# Patient Record
Sex: FEMALE | Race: WHITE | NOT HISPANIC OR LATINO
[De-identification: names, ages, dates, MRNs, and addresses within clinical notes are randomized per-mention and may not be internally consistent; named-entity substitution may affect disease eponyms.]

---

## 2017-02-23 ENCOUNTER — APPOINTMENT (OUTPATIENT)
Dept: ORTHOPEDIC SURGERY | Facility: CLINIC | Age: 52
End: 2017-02-23

## 2018-04-02 ENCOUNTER — APPOINTMENT (OUTPATIENT)
Dept: ULTRASOUND IMAGING | Facility: CLINIC | Age: 53
End: 2018-04-02

## 2018-04-02 ENCOUNTER — APPOINTMENT (OUTPATIENT)
Dept: MAMMOGRAPHY | Facility: CLINIC | Age: 53
End: 2018-04-02

## 2018-04-05 ENCOUNTER — APPOINTMENT (OUTPATIENT)
Dept: OTOLARYNGOLOGY | Facility: CLINIC | Age: 53
End: 2018-04-05
Payer: MEDICARE

## 2018-04-05 VITALS
SYSTOLIC BLOOD PRESSURE: 142 MMHG | DIASTOLIC BLOOD PRESSURE: 78 MMHG | WEIGHT: 170 LBS | HEIGHT: 67 IN | HEART RATE: 81 BPM | BODY MASS INDEX: 26.68 KG/M2

## 2018-04-05 DIAGNOSIS — J37.0 CHRONIC LARYNGITIS: ICD-10-CM

## 2018-04-05 DIAGNOSIS — Z87.39 PERSONAL HISTORY OF OTHER DISEASES OF THE MUSCULOSKELETAL SYSTEM AND CONNECTIVE TISSUE: ICD-10-CM

## 2018-04-05 DIAGNOSIS — Z86.39 PERSONAL HISTORY OF OTHER ENDOCRINE, NUTRITIONAL AND METABOLIC DISEASE: ICD-10-CM

## 2018-04-05 DIAGNOSIS — R49.0 DYSPHONIA: ICD-10-CM

## 2018-04-05 DIAGNOSIS — Z87.09 PERSONAL HISTORY OF OTHER DISEASES OF THE RESPIRATORY SYSTEM: ICD-10-CM

## 2018-04-05 DIAGNOSIS — F17.200 NICOTINE DEPENDENCE, UNSPECIFIED, UNCOMPLICATED: ICD-10-CM

## 2018-04-05 DIAGNOSIS — K21.9 GASTRO-ESOPHAGEAL REFLUX DISEASE W/OUT ESOPHAGITIS: ICD-10-CM

## 2018-04-05 PROCEDURE — 99203 OFFICE O/P NEW LOW 30 MIN: CPT | Mod: 25

## 2018-04-05 PROCEDURE — 31575 DIAGNOSTIC LARYNGOSCOPY: CPT

## 2018-04-05 RX ORDER — OXYCODONE HYDROCHLORIDE 15 MG/1
15 TABLET ORAL
Refills: 0 | Status: ACTIVE | COMMUNITY

## 2018-04-05 RX ORDER — PANTOPRAZOLE 40 MG/1
40 TABLET, DELAYED RELEASE ORAL DAILY
Qty: 30 | Refills: 5 | Status: ACTIVE | COMMUNITY
Start: 2018-04-05 | End: 1900-01-01

## 2018-04-05 RX ORDER — ROSUVASTATIN CALCIUM 5 MG/1
TABLET, FILM COATED ORAL
Refills: 0 | Status: ACTIVE | COMMUNITY

## 2018-04-05 RX ORDER — PREDNISONE 10 MG/1
10 TABLET ORAL TWICE DAILY
Qty: 20 | Refills: 1 | Status: ACTIVE | COMMUNITY
Start: 2018-04-05 | End: 1900-01-01

## 2018-04-05 RX ORDER — ALPRAZOLAM 1 MG/1
1 TABLET ORAL
Refills: 0 | Status: ACTIVE | COMMUNITY

## 2018-04-05 RX ORDER — AZELASTINE HYDROCHLORIDE 137 UG/1
0.1 SPRAY, METERED NASAL TWICE DAILY
Qty: 1 | Refills: 5 | Status: ACTIVE | COMMUNITY
Start: 2018-04-05 | End: 1900-01-01

## 2018-04-18 ENCOUNTER — OUTPATIENT (OUTPATIENT)
Dept: OUTPATIENT SERVICES | Facility: HOSPITAL | Age: 53
LOS: 1 days | End: 2018-04-18
Payer: MEDICARE

## 2018-04-18 ENCOUNTER — APPOINTMENT (OUTPATIENT)
Dept: MAMMOGRAPHY | Facility: CLINIC | Age: 53
End: 2018-04-18
Payer: MEDICARE

## 2018-04-18 ENCOUNTER — APPOINTMENT (OUTPATIENT)
Dept: ULTRASOUND IMAGING | Facility: CLINIC | Age: 53
End: 2018-04-18
Payer: MEDICARE

## 2018-04-18 DIAGNOSIS — Z00.8 ENCOUNTER FOR OTHER GENERAL EXAMINATION: ICD-10-CM

## 2018-04-18 PROCEDURE — 77067 SCR MAMMO BI INCL CAD: CPT

## 2018-04-18 PROCEDURE — 77067 SCR MAMMO BI INCL CAD: CPT | Mod: 26

## 2018-04-18 PROCEDURE — 77063 BREAST TOMOSYNTHESIS BI: CPT | Mod: 26

## 2018-04-18 PROCEDURE — 77063 BREAST TOMOSYNTHESIS BI: CPT

## 2018-04-18 PROCEDURE — 76641 ULTRASOUND BREAST COMPLETE: CPT | Mod: 26,50

## 2018-04-18 PROCEDURE — 76641 ULTRASOUND BREAST COMPLETE: CPT

## 2018-04-23 ENCOUNTER — RX RENEWAL (OUTPATIENT)
Age: 53
End: 2018-04-23

## 2018-04-23 RX ORDER — AZELASTINE HYDROCHLORIDE 137 UG/1
0.1 SPRAY, METERED NASAL TWICE DAILY
Qty: 3 | Refills: 1 | Status: ACTIVE | COMMUNITY
Start: 2018-04-23 | End: 1900-01-01

## 2018-05-03 ENCOUNTER — APPOINTMENT (OUTPATIENT)
Dept: OTOLARYNGOLOGY | Facility: CLINIC | Age: 53
End: 2018-05-03

## 2018-06-26 ENCOUNTER — RX RENEWAL (OUTPATIENT)
Age: 53
End: 2018-06-26

## 2018-06-26 RX ORDER — PANTOPRAZOLE 40 MG/1
40 TABLET, DELAYED RELEASE ORAL DAILY
Qty: 30 | Refills: 3 | Status: ACTIVE | COMMUNITY
Start: 2018-06-26 | End: 1900-01-01

## 2018-07-29 ENCOUNTER — EMERGENCY (EMERGENCY)
Facility: HOSPITAL | Age: 53
LOS: 1 days | Discharge: HOME | End: 2018-07-29
Attending: EMERGENCY MEDICINE
Payer: MEDICARE

## 2018-07-29 VITALS
RESPIRATION RATE: 18 BRPM | DIASTOLIC BLOOD PRESSURE: 76 MMHG | TEMPERATURE: 98 F | SYSTOLIC BLOOD PRESSURE: 127 MMHG | OXYGEN SATURATION: 97 % | HEART RATE: 72 BPM

## 2018-07-29 VITALS — WEIGHT: 171.96 LBS | HEIGHT: 67 IN

## 2018-07-29 PROCEDURE — 72131 CT LUMBAR SPINE W/O DYE: CPT | Mod: 26

## 2018-07-29 PROCEDURE — 99284 EMERGENCY DEPT VISIT MOD MDM: CPT | Mod: 25

## 2018-07-29 PROCEDURE — 99284 EMERGENCY DEPT VISIT MOD MDM: CPT

## 2018-07-29 PROCEDURE — 72131 CT LUMBAR SPINE W/O DYE: CPT

## 2018-07-29 RX ORDER — IBUPROFEN 200 MG
600 TABLET ORAL ONCE
Qty: 0 | Refills: 0 | Status: COMPLETED | OUTPATIENT
Start: 2018-07-29 | End: 2018-07-29

## 2018-07-29 RX ADMIN — Medication 600 MILLIGRAM(S): at 21:21

## 2018-07-29 RX ADMIN — Medication 600 MILLIGRAM(S): at 21:51

## 2018-07-29 NOTE — ED STATDOCS - OBJECTIVE STATEMENT
52 yo F Pt, PmHx: lumbar herniated discs, chronic back pain, HLD, presents to ED complaining of lumbar back pain x 4 days. PT states she fell 4 days ago, and admits to associated back pain. PT states she was seen for fall 4 days ago at , xrays of shoulder, lumbar spine , chest , and knees. PT advised to come in to ED for further workup. PT denies fever ,chills, head injury, LOC, numbness, tingling, incontinence, abdominal pain, urinary symptoms, difficulty ambulating, and any other acute symptoms at this time.

## 2018-07-29 NOTE — ED STATDOCS - ATTENDING CONTRIBUTION TO CARE
53 year old with hx of herniated disc c/o increased back pain s/p fall.  Xrays reviewed showing no acute fracture.  CT ordered by PA-verifying that there is indeed no fracture.  Patient given medication for pain and instructed to follow-up with spine. 53 year old with hx of herniated disc c/o increased back pain s/p fall.  Xrays reviewed showing no acute fracture.  Patient reproducible lumbar tenderness on exam.  CT ordered by PA-verifying that there is indeed no fracture.  Patient given medication for pain and instructed to follow-up with spine.

## 2018-07-29 NOTE — ED STATDOCS - MUSCULOSKELETAL, MLM
range of motion is not limited and there is no muscle tenderness. + TTP of R lumbar midline and paraspinal muscle. FROM of B/L LE

## 2018-07-29 NOTE — ED STATDOCS - MEDICAL DECISION MAKING DETAILS
Back pain, will obtain CT lumbar spine r/o acute fx, PT currently on pain medications from pain management, PT states pain is controlled, will refer to Spine MD

## 2018-08-28 ENCOUNTER — APPOINTMENT (OUTPATIENT)
Dept: MRI IMAGING | Facility: CLINIC | Age: 53
End: 2018-08-28

## 2019-03-06 ENCOUNTER — APPOINTMENT (OUTPATIENT)
Dept: INTERNAL MEDICINE | Facility: CLINIC | Age: 54
End: 2019-03-06
Payer: MEDICARE

## 2019-03-06 VITALS
WEIGHT: 167 LBS | HEIGHT: 67 IN | BODY MASS INDEX: 26.21 KG/M2 | DIASTOLIC BLOOD PRESSURE: 80 MMHG | SYSTOLIC BLOOD PRESSURE: 150 MMHG

## 2019-03-06 PROCEDURE — 99204 OFFICE O/P NEW MOD 45 MIN: CPT | Mod: 25

## 2019-03-06 PROCEDURE — 36415 COLL VENOUS BLD VENIPUNCTURE: CPT

## 2019-03-06 NOTE — HISTORY OF PRESENT ILLNESS
[FreeTextEntry1] : 52y/o woman with chronic low back pain due to disc herniation, s/p epidural in the past, and anxiety follows with psychotherapy weekly, is here complaining of seeing bugs at home that bite her and since few months back she is losing her hair and eyebrows. She is very anxious and frustrated. They had  3 times for whole house, staying in a hotel but it doesn't work. She feels that bugs are also bothering his boyfriend but he doesn't complain. \par he has appt to see psychiatrist next week. \par During interview and exam she is very emotional and cries.

## 2019-03-06 NOTE — PHYSICAL EXAM
[General Appearance - Alert] : alert [General Appearance - In No Acute Distress] : in no acute distress [Sclera] : the sclera and conjunctiva were normal [PERRL With Normal Accommodation] : pupils were equal in size, round, reactive to light [Extraocular Movements] : extraocular movements were intact [Outer Ear] : the ears and nose were normal in appearance [Oropharynx] : the oropharynx was normal with no thrush [Neck Appearance] : the appearance of the neck was normal [Neck Cervical Mass (___cm)] : no neck mass was observed [Jugular Venous Distention Increased] : there was no jugular-venous distention [Thyroid Diffuse Enlargement] : the thyroid was not enlarged [Auscultation Breath Sounds / Voice Sounds] : lungs were clear to auscultation bilaterally [Heart Rate And Rhythm] : heart rate was normal and rhythm regular [Heart Sounds] : normal S1 and S2 [Heart Sounds Gallop] : no gallops [Murmurs] : no murmurs [Heart Sounds Pericardial Friction Rub] : no pericardial rub [Full Pulse] : the pedal pulses are present [Edema] : there was no peripheral edema [Bowel Sounds] : normal bowel sounds [Abdomen Soft] : soft [Abdomen Tenderness] : non-tender [Abdomen Mass (___ Cm)] : no abdominal mass palpated [Costovertebral Angle Tenderness] : no CVA tenderness [No Palpable Adenopathy] : no palpable adenopathy [Musculoskeletal - Swelling] : no joint swelling [Nail Clubbing] : no clubbing  or cyanosis of the fingernails [Motor Tone] : muscle strength and tone were normal [Skin Color & Pigmentation] : normal skin color and pigmentation [] : no rash [Deep Tendon Reflexes (DTR)] : deep tendon reflexes were 2+ and symmetric [Sensation] : the sensory exam was normal to light touch and pinprick [No Focal Deficits] : no focal deficits [FreeTextEntry1] : few small folliculitis type or bite marks

## 2019-03-08 LAB
ALBUMIN SERPL ELPH-MCNC: 4.6 G/DL
ALP BLD-CCNC: 73 U/L
ALT SERPL-CCNC: 16 U/L
ANION GAP SERPL CALC-SCNC: 11 MMOL/L
APPEARANCE: CLEAR
AST SERPL-CCNC: 16 U/L
BACTERIA: NEGATIVE
BASOPHILS # BLD AUTO: 0.03 K/UL
BASOPHILS NFR BLD AUTO: 0.3 %
BILIRUB DIRECT SERPL-MCNC: 0.1 MG/DL
BILIRUB INDIRECT SERPL-MCNC: 0.2 MG/DL
BILIRUB SERPL-MCNC: 0.3 MG/DL
BILIRUBIN URINE: NEGATIVE
BLOOD URINE: NEGATIVE
BUN SERPL-MCNC: 12 MG/DL
CALCIUM SERPL-MCNC: 10.2 MG/DL
CHLORIDE SERPL-SCNC: 103 MMOL/L
CHOLEST SERPL-MCNC: 234 MG/DL
CHOLEST/HDLC SERPL: 3.7 RATIO
CO2 SERPL-SCNC: 28 MMOL/L
COLOR: YELLOW
CREAT SERPL-MCNC: 0.69 MG/DL
EOSINOPHIL # BLD AUTO: 0.14 K/UL
EOSINOPHIL NFR BLD AUTO: 1.6 %
GLUCOSE QUALITATIVE U: NEGATIVE
GLUCOSE SERPL-MCNC: 93 MG/DL
HBA1C MFR BLD HPLC: 5.4 %
HCT VFR BLD CALC: 44.8 %
HDLC SERPL-MCNC: 64 MG/DL
HGB BLD-MCNC: 14.7 G/DL
HYALINE CASTS: 1 /LPF
IMM GRANULOCYTES NFR BLD AUTO: 0.2 %
KETONES URINE: NEGATIVE
LDLC SERPL CALC-MCNC: 144 MG/DL
LEUKOCYTE ESTERASE URINE: NEGATIVE
LYMPHOCYTES # BLD AUTO: 3.06 K/UL
LYMPHOCYTES NFR BLD AUTO: 34.8 %
MAN DIFF?: NORMAL
MCHC RBC-ENTMCNC: 29.8 PG
MCHC RBC-ENTMCNC: 32.8 GM/DL
MCV RBC AUTO: 90.9 FL
MICROSCOPIC-UA: NORMAL
MONOCYTES # BLD AUTO: 0.41 K/UL
MONOCYTES NFR BLD AUTO: 4.7 %
NEUTROPHILS # BLD AUTO: 5.13 K/UL
NEUTROPHILS NFR BLD AUTO: 58.4 %
NITRITE URINE: NEGATIVE
PH URINE: 5.5
PLATELET # BLD AUTO: 264 K/UL
POTASSIUM SERPL-SCNC: 4.6 MMOL/L
PROT SERPL-MCNC: 7.5 G/DL
PROTEIN URINE: NEGATIVE
RBC # BLD: 4.93 M/UL
RBC # FLD: 12.9 %
RED BLOOD CELLS URINE: 4 /HPF
SODIUM SERPL-SCNC: 142 MMOL/L
SPECIFIC GRAVITY URINE: 1.02
SQUAMOUS EPITHELIAL CELLS: 1 /HPF
TRIGL SERPL-MCNC: 129 MG/DL
TSH SERPL-ACNC: 0.84 UIU/ML
UROBILINOGEN URINE: NORMAL
WBC # FLD AUTO: 8.79 K/UL
WHITE BLOOD CELLS URINE: 0 /HPF

## 2019-12-10 ENCOUNTER — OTHER (OUTPATIENT)
Age: 54
End: 2019-12-10

## 2019-12-13 ENCOUNTER — APPOINTMENT (OUTPATIENT)
Dept: ORTHOPEDIC SURGERY | Facility: CLINIC | Age: 54
End: 2019-12-13

## 2019-12-19 ENCOUNTER — APPOINTMENT (OUTPATIENT)
Dept: ORTHOPEDIC SURGERY | Facility: CLINIC | Age: 54
End: 2019-12-19

## 2020-03-31 ENCOUNTER — APPOINTMENT (OUTPATIENT)
Dept: ULTRASOUND IMAGING | Facility: CLINIC | Age: 55
End: 2020-03-31
Payer: MEDICARE

## 2020-03-31 ENCOUNTER — APPOINTMENT (OUTPATIENT)
Dept: MAMMOGRAPHY | Facility: CLINIC | Age: 55
End: 2020-03-31
Payer: MEDICARE

## 2020-03-31 ENCOUNTER — OUTPATIENT (OUTPATIENT)
Dept: OUTPATIENT SERVICES | Facility: HOSPITAL | Age: 55
LOS: 1 days | End: 2020-03-31
Payer: MEDICARE

## 2020-03-31 DIAGNOSIS — R92.8 OTHER ABNORMAL AND INCONCLUSIVE FINDINGS ON DIAGNOSTIC IMAGING OF BREAST: ICD-10-CM

## 2020-03-31 PROCEDURE — 76641 ULTRASOUND BREAST COMPLETE: CPT | Mod: 26,50

## 2020-03-31 PROCEDURE — G0279: CPT | Mod: 26

## 2020-03-31 PROCEDURE — 77066 DX MAMMO INCL CAD BI: CPT | Mod: 26

## 2020-03-31 PROCEDURE — 77066 DX MAMMO INCL CAD BI: CPT

## 2020-03-31 PROCEDURE — G0279: CPT

## 2020-03-31 PROCEDURE — 76641 ULTRASOUND BREAST COMPLETE: CPT

## 2020-04-14 DIAGNOSIS — Z91.89 OTHER SPECIFIED PERSONAL RISK FACTORS, NOT ELSEWHERE CLASSIFIED: ICD-10-CM

## 2020-04-20 ENCOUNTER — APPOINTMENT (OUTPATIENT)
Dept: MRI IMAGING | Facility: CLINIC | Age: 55
End: 2020-04-20
Payer: MEDICARE

## 2020-04-20 ENCOUNTER — RESULT REVIEW (OUTPATIENT)
Age: 55
End: 2020-04-20

## 2020-04-20 ENCOUNTER — OUTPATIENT (OUTPATIENT)
Dept: OUTPATIENT SERVICES | Facility: HOSPITAL | Age: 55
LOS: 1 days | End: 2020-04-20
Payer: MEDICARE

## 2020-04-20 DIAGNOSIS — Z91.89 OTHER SPECIFIED PERSONAL RISK FACTORS, NOT ELSEWHERE CLASSIFIED: ICD-10-CM

## 2020-04-20 PROCEDURE — C8937: CPT

## 2020-04-20 PROCEDURE — C8908: CPT

## 2020-04-20 PROCEDURE — A9585: CPT

## 2020-04-20 PROCEDURE — 77049 MRI BREAST C-+ W/CAD BI: CPT | Mod: 26

## 2020-04-28 ENCOUNTER — APPOINTMENT (OUTPATIENT)
Dept: SURGERY | Facility: CLINIC | Age: 55
End: 2020-04-28

## 2020-09-05 ENCOUNTER — TRANSCRIPTION ENCOUNTER (OUTPATIENT)
Age: 55
End: 2020-09-05

## 2021-08-24 ENCOUNTER — TRANSCRIPTION ENCOUNTER (OUTPATIENT)
Age: 56
End: 2021-08-24

## 2022-05-21 ENCOUNTER — NON-APPOINTMENT (OUTPATIENT)
Age: 57
End: 2022-05-21

## 2022-08-13 ENCOUNTER — EMERGENCY (EMERGENCY)
Facility: HOSPITAL | Age: 57
LOS: 1 days | Discharge: OTHER INSTITUTION | End: 2022-08-13
Attending: STUDENT IN AN ORGANIZED HEALTH CARE EDUCATION/TRAINING PROGRAM
Payer: MEDICARE

## 2022-08-13 VITALS
WEIGHT: 160.06 LBS | OXYGEN SATURATION: 96 % | HEART RATE: 76 BPM | HEIGHT: 67 IN | TEMPERATURE: 99 F | DIASTOLIC BLOOD PRESSURE: 101 MMHG | SYSTOLIC BLOOD PRESSURE: 146 MMHG | RESPIRATION RATE: 16 BRPM

## 2022-08-13 DIAGNOSIS — F43.29 ADJUSTMENT DISORDER WITH OTHER SYMPTOMS: ICD-10-CM

## 2022-08-13 LAB
AMPHET UR-MCNC: NEGATIVE — SIGNIFICANT CHANGE UP
ANION GAP SERPL CALC-SCNC: 10 MMOL/L — SIGNIFICANT CHANGE UP (ref 5–17)
APAP SERPL-MCNC: <3 UG/ML — LOW (ref 10–26)
APPEARANCE UR: CLEAR — SIGNIFICANT CHANGE UP
BACTERIA # UR AUTO: ABNORMAL
BARBITURATES UR SCN-MCNC: NEGATIVE — SIGNIFICANT CHANGE UP
BASOPHILS # BLD AUTO: 0.07 K/UL — SIGNIFICANT CHANGE UP (ref 0–0.2)
BASOPHILS NFR BLD AUTO: 0.6 % — SIGNIFICANT CHANGE UP (ref 0–2)
BENZODIAZ UR-MCNC: POSITIVE
BILIRUB UR-MCNC: NEGATIVE — SIGNIFICANT CHANGE UP
BUN SERPL-MCNC: 19.2 MG/DL — SIGNIFICANT CHANGE UP (ref 8–20)
CALCIUM SERPL-MCNC: 9.3 MG/DL — SIGNIFICANT CHANGE UP (ref 8.4–10.5)
CHLORIDE SERPL-SCNC: 101 MMOL/L — SIGNIFICANT CHANGE UP (ref 98–107)
CO2 SERPL-SCNC: 27 MMOL/L — SIGNIFICANT CHANGE UP (ref 22–29)
COCAINE METAB.OTHER UR-MCNC: NEGATIVE — SIGNIFICANT CHANGE UP
COLOR SPEC: YELLOW — SIGNIFICANT CHANGE UP
CREAT SERPL-MCNC: 0.75 MG/DL — SIGNIFICANT CHANGE UP (ref 0.5–1.3)
DIFF PNL FLD: ABNORMAL
EGFR: 92 ML/MIN/1.73M2 — SIGNIFICANT CHANGE UP
EOSINOPHIL # BLD AUTO: 0.46 K/UL — SIGNIFICANT CHANGE UP (ref 0–0.5)
EOSINOPHIL NFR BLD AUTO: 3.7 % — SIGNIFICANT CHANGE UP (ref 0–6)
EPI CELLS # UR: SIGNIFICANT CHANGE UP
ETHANOL SERPL-MCNC: <10 MG/DL — SIGNIFICANT CHANGE UP (ref 0–9)
GLUCOSE SERPL-MCNC: 100 MG/DL — HIGH (ref 70–99)
GLUCOSE UR QL: NEGATIVE MG/DL — SIGNIFICANT CHANGE UP
HCG UR QL: NEGATIVE — SIGNIFICANT CHANGE UP
HCT VFR BLD CALC: 43.7 % — SIGNIFICANT CHANGE UP (ref 34.5–45)
HGB BLD-MCNC: 15.3 G/DL — SIGNIFICANT CHANGE UP (ref 11.5–15.5)
IMM GRANULOCYTES NFR BLD AUTO: 0.3 % — SIGNIFICANT CHANGE UP (ref 0–1.5)
KETONES UR-MCNC: NEGATIVE — SIGNIFICANT CHANGE UP
LEUKOCYTE ESTERASE UR-ACNC: ABNORMAL
LYMPHOCYTES # BLD AUTO: 3.72 K/UL — HIGH (ref 1–3.3)
LYMPHOCYTES # BLD AUTO: 30 % — SIGNIFICANT CHANGE UP (ref 13–44)
MCHC RBC-ENTMCNC: 32.1 PG — SIGNIFICANT CHANGE UP (ref 27–34)
MCHC RBC-ENTMCNC: 35 GM/DL — SIGNIFICANT CHANGE UP (ref 32–36)
MCV RBC AUTO: 91.6 FL — SIGNIFICANT CHANGE UP (ref 80–100)
METHADONE UR-MCNC: NEGATIVE — SIGNIFICANT CHANGE UP
MONOCYTES # BLD AUTO: 0.7 K/UL — SIGNIFICANT CHANGE UP (ref 0–0.9)
MONOCYTES NFR BLD AUTO: 5.7 % — SIGNIFICANT CHANGE UP (ref 2–14)
NEUTROPHILS # BLD AUTO: 7.39 K/UL — SIGNIFICANT CHANGE UP (ref 1.8–7.4)
NEUTROPHILS NFR BLD AUTO: 59.7 % — SIGNIFICANT CHANGE UP (ref 43–77)
NITRITE UR-MCNC: NEGATIVE — SIGNIFICANT CHANGE UP
OPIATES UR-MCNC: NEGATIVE — SIGNIFICANT CHANGE UP
PCP SPEC-MCNC: SIGNIFICANT CHANGE UP
PCP UR-MCNC: NEGATIVE — SIGNIFICANT CHANGE UP
PH UR: 7 — SIGNIFICANT CHANGE UP (ref 5–8)
PLATELET # BLD AUTO: 234 K/UL — SIGNIFICANT CHANGE UP (ref 150–400)
POTASSIUM SERPL-MCNC: 4.6 MMOL/L — SIGNIFICANT CHANGE UP (ref 3.5–5.3)
POTASSIUM SERPL-SCNC: 4.6 MMOL/L — SIGNIFICANT CHANGE UP (ref 3.5–5.3)
PROT UR-MCNC: 15 MG/DL
RBC # BLD: 4.77 M/UL — SIGNIFICANT CHANGE UP (ref 3.8–5.2)
RBC # FLD: 13.2 % — SIGNIFICANT CHANGE UP (ref 10.3–14.5)
RBC CASTS # UR COMP ASSIST: SIGNIFICANT CHANGE UP /HPF (ref 0–4)
SALICYLATES SERPL-MCNC: <0.6 MG/DL — LOW (ref 10–20)
SARS-COV-2 RNA SPEC QL NAA+PROBE: SIGNIFICANT CHANGE UP
SODIUM SERPL-SCNC: 138 MMOL/L — SIGNIFICANT CHANGE UP (ref 135–145)
SP GR SPEC: 1.01 — SIGNIFICANT CHANGE UP (ref 1.01–1.02)
THC UR QL: NEGATIVE — SIGNIFICANT CHANGE UP
TSH SERPL-MCNC: 1.51 UIU/ML — SIGNIFICANT CHANGE UP (ref 0.27–4.2)
UROBILINOGEN FLD QL: NEGATIVE MG/DL — SIGNIFICANT CHANGE UP
WBC # BLD: 12.38 K/UL — HIGH (ref 3.8–10.5)
WBC # FLD AUTO: 12.38 K/UL — HIGH (ref 3.8–10.5)
WBC UR QL: ABNORMAL /HPF (ref 0–5)

## 2022-08-13 PROCEDURE — 90792 PSYCH DIAG EVAL W/MED SRVCS: CPT

## 2022-08-13 PROCEDURE — 99218: CPT

## 2022-08-13 PROCEDURE — 93010 ELECTROCARDIOGRAM REPORT: CPT

## 2022-08-13 RX ORDER — NICOTINE POLACRILEX 2 MG
1 GUM BUCCAL DAILY
Refills: 0 | Status: DISCONTINUED | OUTPATIENT
Start: 2022-08-13 | End: 2022-08-17

## 2022-08-13 RX ORDER — OXYCODONE HYDROCHLORIDE 5 MG/1
30 TABLET ORAL ONCE
Refills: 0 | Status: DISCONTINUED | OUTPATIENT
Start: 2022-08-13 | End: 2022-08-13

## 2022-08-13 RX ORDER — ALPRAZOLAM 0.25 MG
2 TABLET ORAL ONCE
Refills: 0 | Status: DISCONTINUED | OUTPATIENT
Start: 2022-08-13 | End: 2022-08-13

## 2022-08-13 RX ORDER — OXYCODONE HYDROCHLORIDE 5 MG/1
30 TABLET ORAL EVERY 6 HOURS
Refills: 0 | Status: DISCONTINUED | OUTPATIENT
Start: 2022-08-13 | End: 2022-08-15

## 2022-08-13 RX ORDER — ACETAMINOPHEN 500 MG
975 TABLET ORAL ONCE
Refills: 0 | Status: COMPLETED | OUTPATIENT
Start: 2022-08-13 | End: 2022-08-13

## 2022-08-13 RX ORDER — ALPRAZOLAM 0.25 MG
1 TABLET ORAL THREE TIMES A DAY
Refills: 0 | Status: COMPLETED | OUTPATIENT
Start: 2022-08-13 | End: 2022-08-20

## 2022-08-13 RX ORDER — DEXTROAMPHETAMINE SACCHARATE, AMPHETAMINE ASPARTATE, DEXTROAMPHETAMINE SULFATE AND AMPHETAMINE SULFATE 1.875; 1.875; 1.875; 1.875 MG/1; MG/1; MG/1; MG/1
0 TABLET ORAL
Qty: 0 | Refills: 0 | DISCHARGE

## 2022-08-13 RX ORDER — ATENOLOL 25 MG/1
10 TABLET ORAL
Qty: 0 | Refills: 0 | DISCHARGE

## 2022-08-13 RX ADMIN — Medication 1 MILLIGRAM(S): at 14:58

## 2022-08-13 RX ADMIN — Medication 975 MILLIGRAM(S): at 09:44

## 2022-08-13 RX ADMIN — Medication 975 MILLIGRAM(S): at 10:43

## 2022-08-13 RX ADMIN — Medication 1 PATCH: at 07:17

## 2022-08-13 RX ADMIN — OXYCODONE HYDROCHLORIDE 30 MILLIGRAM(S): 5 TABLET ORAL at 12:30

## 2022-08-13 RX ADMIN — Medication 2 MILLIGRAM(S): at 03:42

## 2022-08-13 RX ADMIN — OXYCODONE HYDROCHLORIDE 30 MILLIGRAM(S): 5 TABLET ORAL at 20:35

## 2022-08-13 RX ADMIN — OXYCODONE HYDROCHLORIDE 30 MILLIGRAM(S): 5 TABLET ORAL at 10:57

## 2022-08-13 RX ADMIN — Medication 1 PATCH: at 22:06

## 2022-08-13 RX ADMIN — Medication 1 MILLIGRAM(S): at 22:11

## 2022-08-13 RX ADMIN — Medication 1 PATCH: at 06:23

## 2022-08-13 RX ADMIN — OXYCODONE HYDROCHLORIDE 30 MILLIGRAM(S): 5 TABLET ORAL at 21:31
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
## 2022-08-13 NOTE — ED ADULT NURSE REASSESSMENT NOTE - NS ED NURSE REASSESS COMMENT FT1
Patient in bed sleeping, easily arousable, no complain voiced, no suicidal thoughts, no visual or auditory hallucination reported, meals and PO fluids tolerated, safety maintained.

## 2022-08-13 NOTE — ED ADULT NURSE REASSESSMENT NOTE - NS ED NURSE REASSESS COMMENT FT1
Assumed care of patient at 0715.  Patient oriented to staff and educated about plan of care including urine sample for testing, EKG, and pending consult.  Patient verbalized understanding of plan of care.  No overt delusions expressed during interaction.  No attempts to harm self or others and safety maintained.

## 2022-08-13 NOTE — ED ADULT NURSE REASSESSMENT NOTE - NS ED NURSE REASSESS COMMENT FT1
Patient complained of back pain, 30 mg on Oxycodone given as per order, nursing will monitor effectiveness, safety maintained.
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
## 2022-08-13 NOTE — ED BEHAVIORAL HEALTH ASSESSMENT NOTE - DESCRIPTION
unemployed DIYA cooperativeVital Signs Last 24 Hrs  T(C): 36.8 (13 Aug 2022 11:03), Max: 37.1 (13 Aug 2022 01:04)  T(F): 98.3 (13 Aug 2022 11:03), Max: 98.8 (13 Aug 2022 01:04)  HR: 72 (13 Aug 2022 11:03) (72 - 99)  BP: 164/78 (13 Aug 2022 11:03) (97/54 - 164/78)  BP(mean): --  RR: 18 (13 Aug 2022 11:03) (16 - 19)  SpO2: 97% (13 Aug 2022 11:03) (96% - 98%)    Parameters below as of 13 Aug 2022 11:03  Patient On (Oxygen Delivery Method): room air
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
## 2022-08-13 NOTE — ED PROVIDER NOTE - PHYSICAL EXAMINATION
Gen: Well appearing in NAD  Head: NC/AT  Neck: trachea midline  Resp:  No distress, lungs cta b/l  Cardiac: Heart rrr. No murmur.   Abdomen: Soft. nontender. Nondistended.   Ext: no deformities  Neuro:  A&O appears non focal  Skin:  Warm and dry as visualized  Psych:  Denies HI/SI. Denies hallucinations. Endorses paranoia.

## 2022-08-13 NOTE — ED ADULT NURSE REASSESSMENT NOTE - NS ED NURSE REASSESS COMMENT FT1
Patient intermittently resting in bed.  Patient endorsed feeling safe in the hospital.  Patient ate 100% of her dinner.  No attempts top harm self or others and safety maintained.
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
## 2022-08-13 NOTE — ED PROVIDER NOTE - ATTENDING CONTRIBUTION TO CARE
Anxious and paranoid appears acutely psychotic, collateral expressing concerns about escalating behavior recently. Evaluated by psychiatry, will likely need admission but will be held for reassessment for final disposition.

## 2022-08-13 NOTE — ED BEHAVIORAL HEALTH ASSESSMENT NOTE - CURRENT PASSIVE IDEATION:
Patient to ED for Altered mental status due to polysubstance abuse.  Known to EMS for heroine use.  Arousable to luis None known

## 2022-08-13 NOTE — ED BEHAVIORAL HEALTH ASSESSMENT NOTE - HYGIENE
HPI


Chief Complaint:  Diabetic


Time Seen by Provider:  22:43


Travel History


International Travel<30 days:  No


Contact w/Intl Traveler<30days:  No


Traveled to known affect area:  No





History of Present Illness


HPI


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 

admitted for pneumonia.  And so over the last few days the patient has 

developed a cough along with asthma exacerbation, her primary care physician 

started her on amoxicillin and a Medrol Dosepak as well as given her a Decadron 

shot today.  The patient has started to notice that her glucose levels were up 

in the 300s and then 400s and finally the 500s despite her using her 

medications.  Patient was also started on TRESIBA for better control, she 

received her first dose today at 5:00.














Patient has an allergy to sulfa, morphine, and shellfish


Past medical history significant for hypercholesterolemia, hypertension, asthma

, ulcer, pancreatitis, cholecystectomy, appendectomy, hysterectomy, diabetes





PFSH


Past Medical History


Arthritis:  No


Asthma:  Yes


Anxiety:  Yes


Depression:  Yes


Heart Rhythm Problems:  No


Cancer:  No


Cardiac Catheterization:  No


Cardiovascular Problems:  Yes


High Cholesterol:  Yes (at one time, pt takes fish oil)


Chest Pain:  No


Congestive Heart Failure:  No


COPD:  No


Diabetes:  Yes


Patient Takes Glucophage:  Yes


Diminished Hearing:  No


Endocrine:  Yes


Gastrointestinal Disorders:  Yes (PAIN TO RIGHT UPPER QUAD, WORSE THIS TIME)


GERD:  Yes


Genitourinary:  No


Hiatal Hernia:  No


Heparin Induced Thrombocytopen:  No


Hypertension:  Yes


Immune Disorder:  No


Implanted Vascular Access Dvce:  No


Kidney Stones:  No


Musculoskeletal:  Yes


Neurologic:  No


Psychiatric:  Yes


Reproductive:  Yes (HYSTERECTOMY)


Respiratory:  Yes


Immunizations Current:  Yes


Migraines:  No


Pancreatitis:  Yes


Sleep Apnea:  No


Thyroid Disease:  No


Ulcer:  Yes


Tetanus Vaccination:  < 5 Years


Influenza Vaccination:  Yes


Pregnant?:  Not Pregnant


:  1


Para:  1





Past Surgical History


Abdominal Surgery:  Yes (ENDOMETRIAL TISSUE REMOVED )


Appendectomy:  Yes


Cardiac Surgery:  No


 Section:  Yes ()


Cholecystectomy:  Yes


Coronary Artery Bypass Graft:  No


Ear Surgery:  No


Endocrine Surgery:  No


Eye Surgery:  No


Genitourinary Surgery:  No


Gynecologic Surgery:  Yes (EXPLORATORY LAP , EXPL LAP  C CAUTERIZATION  

)


Hysterectomy:  Yes


Insulin Pump:  No


Joint Replacement:  No


Neurologic Surgery:  No


Oral Surgery:  No


Pacemaker:  No


Thoracic Surgery:  No


Other Surgery:  Yes (EXP LAP, EXP ABD SURGERY)





Social History


Alcohol Use:  No


Tobacco Use:  No


Substance Use:  No





Allergies-Medications


(Allergen,Severity, Reaction):  


Coded Allergies:  


     Sulfa (Sulfonamide Antibiotics) (Unverified  Allergy, Intermediate, HIVES, 

18)


     morphine (Unverified  Allergy, Intermediate, HIVES, 18)


     shellfish derived (Verified  Allergy, Unknown, Anaphylaxis, 18)


Reported Meds & Prescriptions





Reported Meds & Active Scripts


Active


Doxycycline Hyclate 100 Mg Cap 100 Mg PO BID


Hydrocodone-Acetaminophen  mg Tab 1 Tab PO Q4H PRN


Cipro (Ciprofloxacin HCl) 250 Mg Tab 250 Mg PO BID 5 Days


Metformin (Metformin HCl) 1,000 Mg Tab 1,000 Mg PO BIDPC


     With meals


Reported


Trazodone (Trazodone HCl) 150 Mg Tablet 150 Mg PO HS


Celexa (Citalopram Hydrobromide) 40 Mg Tab 60 Mg PO DAILY


Hydrocodone-Acetaminophen 7.5-325 mg Tab 1 Tab PO DAILY PRN


Proventil Hfa 6.7 GM Inh (Albuterol Sulfate) 90 Mcg/Act Aer 1 Puff INH Q4H PRN


Metoprolol Tartrate 25 Mg Tab 25 Mg PO BID








Review of Systems


General / Constitutional:  No: Fever


Eyes:  No: Visual changes


HENT:  No: Headaches


Cardiovascular:  No: Chest Pain or Discomfort


Respiratory:  No: Shortness of Breath


Gastrointestinal:  No: Abdominal Pain


Genitourinary:  No: Dysuria


Musculoskeletal:  No: Pain


Skin:  No Rash


Neurologic:  No: Weakness


Psychiatric:  No: Depression


Endocrine:  Positive: Polyuria, Polydipsia, Other (Hyperglycemia)


Hematologic/Lymphatic:  No: Easy Bruising





Physical Exam


Narrative


GENERAL: 


SKIN: Warm and dry.


HEAD: Atraumatic. Normocephalic. 


EYES: Pupils equal and round. No scleral icterus. No injection or drainage. 


ENT: No nasal bleeding or discharge.  Mucous membranes pink and moist.


NECK: Trachea midline. No JVD. 


CARDIOVASCULAR: Regular rate and rhythm.  


RESPIRATORY: No accessory muscle use. Clear to auscultation. Breath sounds 

equal bilaterally. 


GASTROINTESTINAL: Abdomen soft, non-tender, nondistended. 


MUSCULOSKELETAL: Extremities without clubbing, cyanosis, or edema. No obvious 

deformities. 


NEUROLOGICAL: Awake and alert. No obvious cranial nerve deficits.  Motor 

grossly within normal limits. Five out of 5 muscle strength in the arms and 

legs.  Normal speech.


PSYCHIATRIC: Appropriate mood and affect; insight and judgment normal.





Data


Data


Last Documented VS





Vital Signs








  Date Time  Temp Pulse Resp B/P (MAP) Pulse Ox O2 Delivery O2 Flow Rate FiO2


 


18 23:10   18  97 Room Air  


 


18 22:36 98.5 105  149/72 (97)    








Orders





 Orders


Electrocardiogram (18 22:53)


Complete Blood Count With Diff (18 22:53)


Comprehensive Metabolic Panel (18 22:53)


Troponin I (18 22:53)


Lipase (18 22:53)


Blood Gas Venous Ph (18 22:53)


Chest, Single Ap (18 22:53)


Insulin Human Regular Inj (Novolin R Inj (18 23:00)


Sodium Chlor 0.9% 1000 Ml Inj (Ns 1000 M (18 23:30)


Insulin Human Regular Inj (Novolin R Inj (18 00:15)


Ondansetron  Odt (Zofran  Odt) (18 00:15)


Albuterol-Ipratropium Neb (Duoneb Neb) (18 00:30)


Insulin Human Regular Inj (Novolin R Inj (18 00:30)


Ed Discharge Order (18 00:42)





Labs





Laboratory Tests








Test


  18


23:04 18


23:17


 


White Blood Count 10.1 TH/MM3  


 


Red Blood Count 5.49 MIL/MM3  


 


Hemoglobin 15.7 GM/DL  


 


Hematocrit 48.4 %  


 


Mean Corpuscular Volume 88.2 FL  


 


Mean Corpuscular Hemoglobin 28.6 PG  


 


Mean Corpuscular Hemoglobin


Concent 32.4 % 


  


 


 


Red Cell Distribution Width 11.6 %  


 


Platelet Count 273 TH/MM3  


 


Mean Platelet Volume 9.2 FL  


 


Neutrophils (%) (Auto) 86.4 %  


 


Lymphocytes (%) (Auto) 11.1 %  


 


Monocytes (%) (Auto) 0.7 %  


 


Eosinophils (%) (Auto) 0.1 %  


 


Basophils (%) (Auto) 1.7 %  


 


Neutrophils # (Auto) 8.7 TH/MM3  


 


Lymphocytes # (Auto) 1.1 TH/MM3  


 


Monocytes # (Auto) 0.1 TH/MM3  


 


Eosinophils # (Auto) 0.0 TH/MM3  


 


Basophils # (Auto) 0.2 TH/MM3  


 


CBC Comment DIFF FINAL  


 


Differential Comment   


 


Blood Urea Nitrogen 17 MG/DL  


 


Creatinine 1.20 MG/DL  


 


Random Glucose 622 MG/DL  


 


Total Protein 8.3 GM/DL  


 


Albumin 4.3 GM/DL  


 


Calcium Level 9.4 MG/DL  


 


Alkaline Phosphatase 160 U/L  


 


Aspartate Amino Transf


(AST/SGOT) 29 U/L 


  


 


 


Alanine Aminotransferase


(ALT/SGPT) 45 U/L 


  


 


 


Total Bilirubin 0.4 MG/DL  


 


Sodium Level 128 MEQ/L  


 


Potassium Level 4.4 MEQ/L  


 


Chloride Level 93 MEQ/L  


 


Carbon Dioxide Level 22.7 MEQ/L  


 


Anion Gap 12 MEQ/L  


 


Estimat Glomerular Filtration


Rate 49 ML/MIN 


  


 


 


Troponin I


  LESS THAN 0.02


NG/ML 


 


 


Lipase 533 U/L  


 


Venous Blood pH  7.38 











MDM


Medical Decision Making


Medical Screen Exam Complete:  Yes


Emergency Medical Condition:  Yes


Medical Record Reviewed:  Yes


Interpretation(s)


Pulse ox, had excellent pleth wave, on room air oximetry reads between 96 and 

100 which is within normal limits and does not show any evidence of hypoxemia





EKG shows normal sinus rhythm, normal intervals, incomplete right bundle branch 

block pattern noted, no evidence of any ST elevation MI pattern,


Differential Diagnosis


DKA versus hyperglycemia versus electrolyte abnormalities versus pneumonia 

versus STEMI


Narrative Course


CBC shows no leukocytosis, slight hemoconcentration of 16/48, 86% neutrophilia, 

normal platelet count


Pseudohyponatremia 128


VBG 7.38 pH which is within normal limits








My interpretation of the portable chest x-ray is consistent with some prominent 

bronchial markings but without any pneumothorax, consolidation,pleural effusion

, nor any cardiomegaly.





Diagnosis





 Primary Impression:  


 Hyperglycemia secondary to steroid


Patient Instructions:  Diabetic Hyperglycemia (ED), General Instructions


Disposition:  01 DISCHARGE HOME


Condition:  Stable











Jesus Solorzano MD 2018 23:25 [place]

## 2022-08-13 NOTE — ED BEHAVIORAL HEALTH ASSESSMENT NOTE - REASON
reassess due to paranoid thinking patient reports she is not suicidal at this time, pt reports she feel like she needs rest.
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
## 2022-08-13 NOTE — ED ADULT NURSE REASSESSMENT NOTE - NS ED NURSE REASSESS COMMENT FT1
Patient ate 100% of her lunch.  Patient reported relief of back pain post second medication intervention.  No attempts to harm self or others and safety maintained.

## 2022-08-13 NOTE — ED BEHAVIORAL HEALTH ASSESSMENT NOTE - RISK ASSESSMENT
PF: denies suicidal ideation, no SA history, reasons to live, has psychiatrist  RF: stress factors due to money and living situation, paranoid thinking Low Acute Suicide Risk

## 2022-08-13 NOTE — ED PROVIDER NOTE - PROGRESS NOTE DETAILS
KELLY Fofana: still pending placement per , no acute events during shift, ekg non-ischemic and recorded in results tab.
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
## 2022-08-14 PROCEDURE — 99226: CPT

## 2022-08-14 PROCEDURE — 99213 OFFICE O/P EST LOW 20 MIN: CPT

## 2022-08-14 RX ORDER — IBUPROFEN 200 MG
600 TABLET ORAL ONCE
Refills: 0 | Status: COMPLETED | OUTPATIENT
Start: 2022-08-14 | End: 2022-08-14

## 2022-08-14 RX ADMIN — OXYCODONE HYDROCHLORIDE 30 MILLIGRAM(S): 5 TABLET ORAL at 10:01

## 2022-08-14 RX ADMIN — Medication 1 PATCH: at 19:22

## 2022-08-14 RX ADMIN — Medication 600 MILLIGRAM(S): at 06:19

## 2022-08-14 RX ADMIN — OXYCODONE HYDROCHLORIDE 30 MILLIGRAM(S): 5 TABLET ORAL at 20:35

## 2022-08-14 RX ADMIN — Medication 600 MILLIGRAM(S): at 05:55

## 2022-08-14 RX ADMIN — Medication 1 MILLIGRAM(S): at 13:48

## 2022-08-14 RX ADMIN — OXYCODONE HYDROCHLORIDE 30 MILLIGRAM(S): 5 TABLET ORAL at 03:29

## 2022-08-14 RX ADMIN — Medication 1 PATCH: at 07:58

## 2022-08-14 RX ADMIN — Medication 1 MILLIGRAM(S): at 05:59

## 2022-08-14 RX ADMIN — OXYCODONE HYDROCHLORIDE 30 MILLIGRAM(S): 5 TABLET ORAL at 20:04

## 2022-08-14 RX ADMIN — Medication 1 MILLIGRAM(S): at 22:12

## 2022-08-14 RX ADMIN — Medication 1 PATCH: at 06:19

## 2022-08-14 RX ADMIN — OXYCODONE HYDROCHLORIDE 30 MILLIGRAM(S): 5 TABLET ORAL at 10:45

## 2022-08-14 NOTE — ED ADULT NURSE REASSESSMENT NOTE - NS ED NURSE REASSESS COMMENT FT1
Patient in bed awake, awaiting for transfer, no complain at this time, snack given and tolerated, safety maintained. Patient in bed awake, no complain at this time, remain paranoid, snack given and tolerated, safety maintained.

## 2022-08-14 NOTE — ED ADULT NURSE REASSESSMENT NOTE - NS ED NURSE REASSESS COMMENT FT1
Patient endorsing she feels she would benefit from an inpatient psychiatric admission after speaking with her support system on the phone.  Patient complaint with medications.  Patient ate 100% of her lunch.  No attempts to harm self or others and safety maintained.
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
## 2022-08-14 NOTE — ED ADULT NURSE REASSESSMENT NOTE - NS ED NURSE REASSESS COMMENT FT1
Patient at times seeks reassurance from staff that her decision to be hospitalized is going to help her.  Patient receptive to support.  Patient ate 100% of her dinner.  No attempts to harm self or others and safety maintained.

## 2022-08-14 NOTE — ED ADULT NURSE REASSESSMENT NOTE - NS ED NURSE REASSESS COMMENT FT1
Oxycodone 30 mg given to patient as per her request, patient stated she is in excruciating pain and request additional Motrim for back pain. Patient report pain 11/10. Nursing will monitor effectiveness of Oxycodone, safety maintained.

## 2022-08-14 NOTE — ED ADULT NURSE REASSESSMENT NOTE - NS ED NURSE REASSESS COMMENT FT1
Assumed care of patient at 0715.  Patient oriented to staff and educated about plan of care.  Patient questioning if medication administered for pain is correct due to poor back pain relief.  Patient educated about medications and provided reassurance of correct medications administered. Patient provided extra pillows for comfort.  No attempts to harm self or others and safety maintained.

## 2022-08-14 NOTE — ED ADULT NURSE REASSESSMENT NOTE - NS ED NURSE REASSESS COMMENT FT1
Remain  Anxious and paranoid appears, acutely psychotic, awake during the night for Oxycodone and Ibuprofen, telling nursing staff she does believe she does not gets her proper medication. Denied suicidal thoughts, denied visual and auditory hallucination, Patient is here for reassessment for possible in patient admission, safety maintained.

## 2022-08-14 NOTE — ED ADULT NURSE REASSESSMENT NOTE - STATUS
awaiting consult
awaiting consult
awaiting transfer, no change
awaiting transfer, no change
reassessment
reassessment
awaiting consult
awaiting transfer, no change

## 2022-08-15 VITALS
HEART RATE: 77 BPM | DIASTOLIC BLOOD PRESSURE: 82 MMHG | TEMPERATURE: 98 F | RESPIRATION RATE: 18 BRPM | OXYGEN SATURATION: 98 % | SYSTOLIC BLOOD PRESSURE: 142 MMHG

## 2022-08-15 PROCEDURE — 81025 URINE PREGNANCY TEST: CPT

## 2022-08-15 PROCEDURE — G0378: CPT

## 2022-08-15 PROCEDURE — U0003: CPT

## 2022-08-15 PROCEDURE — U0005: CPT

## 2022-08-15 PROCEDURE — 84443 ASSAY THYROID STIM HORMONE: CPT

## 2022-08-15 PROCEDURE — 81001 URINALYSIS AUTO W/SCOPE: CPT

## 2022-08-15 PROCEDURE — 80307 DRUG TEST PRSMV CHEM ANLYZR: CPT

## 2022-08-15 PROCEDURE — 85025 COMPLETE CBC W/AUTO DIFF WBC: CPT

## 2022-08-15 PROCEDURE — 99217: CPT

## 2022-08-15 PROCEDURE — 93005 ELECTROCARDIOGRAM TRACING: CPT

## 2022-08-15 PROCEDURE — 36415 COLL VENOUS BLD VENIPUNCTURE: CPT

## 2022-08-15 PROCEDURE — 80048 BASIC METABOLIC PNL TOTAL CA: CPT

## 2022-08-15 PROCEDURE — 99284 EMERGENCY DEPT VISIT MOD MDM: CPT

## 2022-08-15 PROCEDURE — 99213 OFFICE O/P EST LOW 20 MIN: CPT

## 2022-08-15 RX ORDER — IBUPROFEN 200 MG
600 TABLET ORAL EVERY 6 HOURS
Refills: 0 | Status: DISCONTINUED | OUTPATIENT
Start: 2022-08-15 | End: 2022-08-17

## 2022-08-15 RX ADMIN — Medication 1 MILLIGRAM(S): at 06:02

## 2022-08-15 RX ADMIN — OXYCODONE HYDROCHLORIDE 30 MILLIGRAM(S): 5 TABLET ORAL at 12:41

## 2022-08-15 RX ADMIN — Medication 1 MILLIGRAM(S): at 13:30

## 2022-08-15 RX ADMIN — OXYCODONE HYDROCHLORIDE 30 MILLIGRAM(S): 5 TABLET ORAL at 09:55

## 2022-08-15 RX ADMIN — OXYCODONE HYDROCHLORIDE 30 MILLIGRAM(S): 5 TABLET ORAL at 05:27

## 2022-08-15 RX ADMIN — Medication 1 PATCH: at 09:41

## 2022-08-15 RX ADMIN — Medication 600 MILLIGRAM(S): at 06:32

## 2022-08-15 NOTE — ED CDU PROVIDER SUBSEQUENT DAY NOTE - MEDICAL DECISION MAKING DETAILS
Anxious and paranoid appears acutely psychotic, collateral expressing concerns about escalating behavior recently. Will be admitted for psychiatric treatment and stabilization.
Anxious and paranoid appears acutely psychotic, collateral expressing concerns about escalating behavior recently. Evaluated by psychiatry, will likely need admission but will be held for reassessment for final disposition.

## 2022-08-15 NOTE — ED ADULT NURSE REASSESSMENT NOTE - COMFORT CARE
ambulated to bathroom
ambulated to bathroom/meal provided/plan of care explained
plan of care explained
plan of care explained
ambulated to bathroom/plan of care explained/po fluids offered
meal provided/plan of care explained
meal provided/plan of care explained
meal provided
meal provided/plan of care explained
plan of care explained/po fluids offered

## 2022-08-15 NOTE — ED BEHAVIORAL HEALTH NOTE - BEHAVIORAL HEALTH NOTE
PROGRESS NOTE: 08-15-22 @ 10:05  	  • Reason for Ongoing Consultation: 	no bed available    ID: 57yyo Female with HEALTH ISSUES - PROBLEM Dx:  paranoid delusions            INTERVAL DATA:   • Interval Chief Complaint: I think I was being followed  • Interval History: expresses paranoia about surveillance in home being followed medications going missing  ambivalent about plan for admission      REVIEW OF SYSTEMS:   • Constitutional Symptoms	anxiety  • Eyes	No complaints  • Ears / Nose / Throat / Mouth	No complaints  • Cardiovascular	No complaints  • Respiratory	No complaints  • Gastrointestinal	No complaints  • Genitourinary	No complaints  • Musculoskeletal	back pain  • Skin	No complaints  • Neurological	No complaints  • Psychiatric (see HPI)	See HPI  • Endocrine	No complaints  • Hematologic / Lymphatic	No complaints  • Allergic / Immunologic	No complaints    REVIEW OF VITALS/LABS/IMAGING/INVESTIGATIONS:   • Vital signs reviewed: Yes  • Vital Signs:	    T(C): 36.9 (08-15-22 @ 07:33), Max: 36.9 (08-14-22 @ 19:00)  HR: 75 (08-15-22 @ 07:33) (69 - 75)  BP: 160/84 (08-15-22 @ 07:33) (124/70 - 160/84)  RR: 18 (08-15-22 @ 07:33) (18 - 19)  SpO2: 97% (08-15-22 @ 07:33) (97% - 99%)    • Available labs reviewed: Yes  • Available Lab Results:   MEDICATIONS:    PRN Medications:  • PRN Medications since last evaluation	  • PRN Details	  Current Medications:   alprazolam 1 milliGRAM(s) Oral three times a day  ibuprofen  Tablet. 600 milliGRAM(s) Oral every 6 hours PRN  nicotine -  14 mG/24Hr(s) Patch 1 Patch Transdermal daily  oxycodone    IR 30 milliGRAM(s) Oral every 6 hours PRN     Medication Side Effects:  • Medication Side Effects or Adverse Reactions (new or ongoing)	None known    MENTAL STATUS EXAM:   • Level of Consciousness	Alert  • General Appearance	Well developed  • Body Habitus	Well nourished  • Hygiene	Good  • Grooming	Good  • Behavior	Cooperative  • Eye Contact	Good  • Relatedness	Good  • Impulse Control	Normal  • Muscle Tone / Strength	Normal muscle tone/strength  • Abnormal Movements	No abnormal movements  • Gait / Station	Normal gait / station  • Speech Volume	Normal  • Speech Rate	Normal  • Speech Spontaneity	Normal  • Speech Articulation	Normal  • Mood	anxious  • Affect Quality anxious  • Affect Range	constricted  • Affect Congruence	Congruent  • Thought Process	Linear  • Thought Associations	Normal  • Thought Content	paranoia  • Perceptions	No abnormalities  • Oriented to Time	Yes  • Oriented to Place	Yes  • Oriented to Situation	Yes  • Oriented to Person	Yes  • Attention / Concentration	Normal  • Estimated Intelligence	Average  • Recent Memory	Normal  • Remote Memory	Normal  • Fund of Knowledge	Normal  • Language	No abnormalities noted  • Judgment (regarding everyday events)	Fair  • Insight (regarding psychiatric illness)	Fair    SUICIDALITY:   • Suicidality (Interval)	none known    HOMICIDALITY/AGGRESSION:   • Homicidality/Aggression	none known    DIAGNOSIS DSM-V:    Psychiatric Diagnosis (Corresponds to DSM-IV Axis I, II):   HEALTH ISSUES - PROBLEM Dx:  psychotic disorder NOS             Medical Diagnosis (Corresponds to DSM-IV Axis III):  • Axis III	  back pain    ASSESSMENT OF CURRENT CONDITION:   Summary (include case differential, formulation and patient response to therapy): no interval changes remains anxious with paranoid themes    Risk Assessment (consider static vs modifiable risk factors and protective factors; comment on level of risk for dangerous behavior): low suicidal risk    PLAN    9.13 voluntary admission to [facility]

## 2022-08-15 NOTE — ED ADULT NURSE REASSESSMENT NOTE - NS ED NURSE REASSESS COMMENT FT1
Patient alert and awake received AM medication, tolerated, Ibuprofen given as per request for complain of pain 3/10, patient slept without disruption, no event to report, patient denied suicidal thoughts, denied visual and auditory hallucinations, all patient needs attend, safety maintained.

## 2022-08-15 NOTE — ED CDU PROVIDER SUBSEQUENT DAY NOTE - HISTORY
No events overnight. Awaiting voluntary admission.
No events overnight. Awaiting reassessment by psychiatry
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
## 2022-08-15 NOTE — ED ADULT NURSE REASSESSMENT NOTE - GENERAL PATIENT STATE
anxious
resting/sleeping
comfortable appearance/cooperative
anxious/cooperative
comfortable appearance
comfortable appearance/cooperative
comfortable appearance/no change observed/resting/sleeping
comfortable appearance/cooperative

## 2022-08-15 NOTE — ED CDU PROVIDER SUBSEQUENT DAY NOTE - PHYSICAL EXAMINATION
Gen: Well appearing in NAD  Head: NC/AT  Neck: trachea midline  Resp:  No distress, lungs cta b/l  Cardiac: Heart rrr. No murmur.   Abdomen: Soft. nontender. Nondistended.   Ext: no deformities  Neuro:  A&O appears non focal  Skin:  Warm and dry as visualized  Psych:  Denies HI/SI. Denies hallucinations. Endorses paranoia.
Gen: Well appearing in NAD  Head: NC/AT  Neck: trachea midline  Resp:  No distress, lungs cta b/l  Cardiac: Heart rrr. No murmur.   Abdomen: Soft. nontender. Nondistended.   Ext: no deformities  Neuro:  A&O appears non focal  Skin:  Warm and dry as visualized  Psych:  Denies HI/SI. Denies hallucinations. Endorses paranoia.

## 2022-08-15 NOTE — ED ADULT NURSE REASSESSMENT NOTE - NS ED NURSE REASSESS COMMENT FT1
pt consumed lunch and po fluids. pt educated on the plan to transfer her to [facility] for inpatient tx. Pt initially was reluctant to go inpatient but endorses plan s/p talking with the psychiatrist.

## 2022-09-20 ENCOUNTER — EMERGENCY (EMERGENCY)
Facility: HOSPITAL | Age: 57
LOS: 1 days | Discharge: HOME | End: 2022-09-20
Attending: EMERGENCY MEDICINE
Payer: MEDICARE

## 2022-09-20 VITALS
RESPIRATION RATE: 16 BRPM | HEART RATE: 94 BPM | WEIGHT: 149.91 LBS | OXYGEN SATURATION: 97 % | HEIGHT: 67 IN | SYSTOLIC BLOOD PRESSURE: 124 MMHG | DIASTOLIC BLOOD PRESSURE: 58 MMHG | TEMPERATURE: 99 F

## 2022-09-20 VITALS
SYSTOLIC BLOOD PRESSURE: 137 MMHG | RESPIRATION RATE: 18 BRPM | DIASTOLIC BLOOD PRESSURE: 68 MMHG | TEMPERATURE: 98 F | OXYGEN SATURATION: 96 % | HEART RATE: 81 BPM

## 2022-09-20 DIAGNOSIS — F14.10 COCAINE ABUSE, UNCOMPLICATED: ICD-10-CM

## 2022-09-20 DIAGNOSIS — F19.94 OTHER PSYCHOACTIVE SUBSTANCE USE, UNSPECIFIED WITH PSYCHOACTIVE SUBSTANCE-INDUCED MOOD DISORDER: ICD-10-CM

## 2022-09-20 LAB
ALBUMIN SERPL ELPH-MCNC: 4.3 G/DL — SIGNIFICANT CHANGE UP (ref 3.3–5.2)
ALP SERPL-CCNC: 89 U/L — SIGNIFICANT CHANGE UP (ref 40–120)
ALT FLD-CCNC: 16 U/L — SIGNIFICANT CHANGE UP
ANION GAP SERPL CALC-SCNC: 12 MMOL/L — SIGNIFICANT CHANGE UP (ref 5–17)
APAP SERPL-MCNC: <3 UG/ML — LOW (ref 10–26)
AST SERPL-CCNC: 18 U/L — SIGNIFICANT CHANGE UP
BASOPHILS # BLD AUTO: 0.05 K/UL — SIGNIFICANT CHANGE UP (ref 0–0.2)
BASOPHILS NFR BLD AUTO: 0.4 % — SIGNIFICANT CHANGE UP (ref 0–2)
BILIRUB SERPL-MCNC: 0.3 MG/DL — LOW (ref 0.4–2)
BUN SERPL-MCNC: 11.5 MG/DL — SIGNIFICANT CHANGE UP (ref 8–20)
CALCIUM SERPL-MCNC: 9.2 MG/DL — SIGNIFICANT CHANGE UP (ref 8.4–10.5)
CHLORIDE SERPL-SCNC: 99 MMOL/L — SIGNIFICANT CHANGE UP (ref 98–107)
CO2 SERPL-SCNC: 27 MMOL/L — SIGNIFICANT CHANGE UP (ref 22–29)
CREAT SERPL-MCNC: 0.62 MG/DL — SIGNIFICANT CHANGE UP (ref 0.5–1.3)
EGFR: 103 ML/MIN/1.73M2 — SIGNIFICANT CHANGE UP
EOSINOPHIL # BLD AUTO: 0.21 K/UL — SIGNIFICANT CHANGE UP (ref 0–0.5)
EOSINOPHIL NFR BLD AUTO: 1.9 % — SIGNIFICANT CHANGE UP (ref 0–6)
ETHANOL SERPL-MCNC: <10 MG/DL — SIGNIFICANT CHANGE UP (ref 0–9)
GLUCOSE SERPL-MCNC: 97 MG/DL — SIGNIFICANT CHANGE UP (ref 70–99)
HCG SERPL-ACNC: <4 MIU/ML — SIGNIFICANT CHANGE UP
HCT VFR BLD CALC: 43.8 % — SIGNIFICANT CHANGE UP (ref 34.5–45)
HGB BLD-MCNC: 15.4 G/DL — SIGNIFICANT CHANGE UP (ref 11.5–15.5)
IMM GRANULOCYTES NFR BLD AUTO: 0.4 % — SIGNIFICANT CHANGE UP (ref 0–0.9)
LYMPHOCYTES # BLD AUTO: 1.94 K/UL — SIGNIFICANT CHANGE UP (ref 1–3.3)
LYMPHOCYTES # BLD AUTO: 17.1 % — SIGNIFICANT CHANGE UP (ref 13–44)
MCHC RBC-ENTMCNC: 31.6 PG — SIGNIFICANT CHANGE UP (ref 27–34)
MCHC RBC-ENTMCNC: 35.2 GM/DL — SIGNIFICANT CHANGE UP (ref 32–36)
MCV RBC AUTO: 89.9 FL — SIGNIFICANT CHANGE UP (ref 80–100)
MONOCYTES # BLD AUTO: 0.69 K/UL — SIGNIFICANT CHANGE UP (ref 0–0.9)
MONOCYTES NFR BLD AUTO: 6.1 % — SIGNIFICANT CHANGE UP (ref 2–14)
NEUTROPHILS # BLD AUTO: 8.4 K/UL — HIGH (ref 1.8–7.4)
NEUTROPHILS NFR BLD AUTO: 74.1 % — SIGNIFICANT CHANGE UP (ref 43–77)
PLATELET # BLD AUTO: 194 K/UL — SIGNIFICANT CHANGE UP (ref 150–400)
POTASSIUM SERPL-MCNC: 3.9 MMOL/L — SIGNIFICANT CHANGE UP (ref 3.5–5.3)
POTASSIUM SERPL-SCNC: 3.9 MMOL/L — SIGNIFICANT CHANGE UP (ref 3.5–5.3)
PROT SERPL-MCNC: 7 G/DL — SIGNIFICANT CHANGE UP (ref 6.6–8.7)
RBC # BLD: 4.87 M/UL — SIGNIFICANT CHANGE UP (ref 3.8–5.2)
RBC # FLD: 13 % — SIGNIFICANT CHANGE UP (ref 10.3–14.5)
SALICYLATES SERPL-MCNC: <0.6 MG/DL — LOW (ref 10–20)
SARS-COV-2 RNA SPEC QL NAA+PROBE: SIGNIFICANT CHANGE UP
SODIUM SERPL-SCNC: 138 MMOL/L — SIGNIFICANT CHANGE UP (ref 135–145)
WBC # BLD: 11.33 K/UL — HIGH (ref 3.8–10.5)
WBC # FLD AUTO: 11.33 K/UL — HIGH (ref 3.8–10.5)

## 2022-09-20 PROCEDURE — 36415 COLL VENOUS BLD VENIPUNCTURE: CPT

## 2022-09-20 PROCEDURE — 93010 ELECTROCARDIOGRAM REPORT: CPT

## 2022-09-20 PROCEDURE — 93005 ELECTROCARDIOGRAM TRACING: CPT

## 2022-09-20 PROCEDURE — 84702 CHORIONIC GONADOTROPIN TEST: CPT

## 2022-09-20 PROCEDURE — 99284 EMERGENCY DEPT VISIT MOD MDM: CPT | Mod: 25

## 2022-09-20 PROCEDURE — G0378: CPT

## 2022-09-20 PROCEDURE — 80053 COMPREHEN METABOLIC PANEL: CPT

## 2022-09-20 PROCEDURE — U0003: CPT

## 2022-09-20 PROCEDURE — 99234 HOSP IP/OBS SM DT SF/LOW 45: CPT

## 2022-09-20 PROCEDURE — 85025 COMPLETE CBC W/AUTO DIFF WBC: CPT

## 2022-09-20 PROCEDURE — U0005: CPT

## 2022-09-20 PROCEDURE — 80307 DRUG TEST PRSMV CHEM ANLYZR: CPT

## 2022-09-20 PROCEDURE — 99284 EMERGENCY DEPT VISIT MOD MDM: CPT

## 2022-09-20 RX ORDER — ALPRAZOLAM 0.25 MG
0 TABLET ORAL
Qty: 0 | Refills: 0 | DISCHARGE

## 2022-09-20 RX ORDER — OXYCODONE HYDROCHLORIDE 5 MG/1
30 TABLET ORAL
Qty: 0 | Refills: 0 | DISCHARGE

## 2022-09-20 RX ORDER — ATENOLOL 25 MG/1
25 TABLET ORAL
Qty: 0 | Refills: 0 | DISCHARGE

## 2022-09-20 RX ORDER — DEXTROAMPHETAMINE SACCHARATE, AMPHETAMINE ASPARTATE, DEXTROAMPHETAMINE SULFATE AND AMPHETAMINE SULFATE 1.875; 1.875; 1.875; 1.875 MG/1; MG/1; MG/1; MG/1
0 TABLET ORAL
Qty: 0 | Refills: 0 | DISCHARGE

## 2022-09-20 RX ORDER — ALPRAZOLAM 0.25 MG
0.5 TABLET ORAL ONCE
Refills: 0 | Status: DISCONTINUED | OUTPATIENT
Start: 2022-09-20 | End: 2022-09-20

## 2022-09-20 RX ADMIN — Medication 0.5 MILLIGRAM(S): at 02:19

## 2022-09-20 NOTE — ED BEHAVIORAL HEALTH ASSESSMENT NOTE - OTHER PAST PSYCHIATRIC HISTORY (INCLUDE DETAILS REGARDING ONSET, COURSE OF ILLNESS, INPATIENT/OUTPATIENT TREATMENT)
See HPI. Pt denies any previous suicidality, pt treated at [facility] then transferred to [facility] a few weeks ago. Unknown if pt sees psychiatrist. See HPI. Pt denies any previous suicidality, pt treated at [facility] then transferred to [facility] a few weeks ago. Pt sees Dr. Lyons in [place].

## 2022-09-20 NOTE — ED BEHAVIORAL HEALTH ASSESSMENT NOTE - CURRENT MEDICATION
Per Istop:   Xanax 0.5 mg PO once daily   Oxycodone HCl 30 mg PO once daily   Dextroamphetamine 20 mg PO once daily     Pt also report Prozac and Seroquel dosage is unknown.     Per previous note:   -Pantoprazole Sodium 40 MG Oral Tablet Delayed Release TAKE 1 TABLET DAILY	  -Azelastine HCl - 0.1 % Nasal Solution 1 TO 2 SPRAYS IN EACH NOSTRIL TWICE DAILY AS NEEDED. 	  -PredniSONE 10 MG Oral Tablet. TAKE 1 TABLET TWICE DAILY.   -Crestor TABS unknown dosage. Per Istop:   Xanax 0.5 mg PO once daily   Oxycodone HCl 30 mg PO once daily   Dextroamphetamine 20 mg PO once daily     Pt also report Prozac and Seroquel dosage is unknown. Pt states she has now switched from Prozac to Wellbutrin but has not picked up prescription yet.     Per previous note:   -Pantoprazole Sodium 40 MG Oral Tablet Delayed Release TAKE 1 TABLET DAILY	  -Azelastine HCl - 0.1 % Nasal Solution 1 TO 2 SPRAYS IN EACH NOSTRIL TWICE DAILY AS NEEDED. 	  -PredniSONE 10 MG Oral Tablet. TAKE 1 TABLET TWICE DAILY.   -Crestor TABS unknown dosage.

## 2022-09-20 NOTE — ED ADULT NURSE NOTE - HPI (INCLUDE ILLNESS QUALITY, SEVERITY, DURATION, TIMING, CONTEXT, MODIFYING FACTORS, ASSOCIATED SIGNS AND SYMPTOMS)
patient rec'd to  unit in yellow gown and hortencia by solomon for contraband,  Pt states that she was started on Prozac about 1 month ago and fees that the paranoidpt states that she spoke to her PMD and requested to be switched to Wellbutin.  patient had been is getting worse.  Pt states that stopped taking it 4 days ago. and states that she was scared of everything and certain peoplept states that. patient rec'd to  unit in yellow gown and hortencia by security for contraband,  Pt states that she was started on Prozac about 1 month ago and feels that the paranoid pt states that she spoke to her PMD and requested to be switched to Wellbutin.  patient had been is getting worse.  Pt states that stopped taking it 4 days ago. and states that she was scared of everything and certain people.  Pt sttes tht she had also case taking Seroquel , which she has also stopped. pt states that she has been to [facility] about 1 month ago and states tht the place was "terrible" and put in a 72 hour letter and left.  patient states not sleeping well but eat well.  Pt states haing no access to FA  Pt states that she lives with he ex-boyfriend not  and has no childrm not working due to a back injury

## 2022-09-20 NOTE — ED BEHAVIORAL HEALTH ASSESSMENT NOTE - NSSUICPROTFACT_PSY_ALL_CORE
pt from [facility] for PEG Tube Replacement. Pt is nonverbal at baseline. Hx of CHF on 2L NC.
Supportive social network of family or friends
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
## 2022-09-20 NOTE — ED ADULT NURSE REASSESSMENT NOTE - NS ED NURSE REASSESS COMMENT FT1
Reviewed discharge plans and verbalized understanding of plans and provided a copy of discharge instructions.  Patient agrees to return to local ED or call 911 if symptoms worsen or thoughts to harm self or others develop.
Assumed care of patient at 0715.  Patient resting in bed appears to be sleeping with no distress and regular non labored breathing.  Safety of patient maintained.

## 2022-09-20 NOTE — ED BEHAVIORAL HEALTH ASSESSMENT NOTE - DESCRIPTION
Pt was calm and cooperative, was not forthcoming with details   Vital Signs Last 24 Hrs  T(C): 36.6 (20 Sep 2022 07:51), Max: 37.2 (20 Sep 2022 01:25)  T(F): 97.9 (20 Sep 2022 07:51), Max: 98.9 (20 Sep 2022 01:25)  HR: 83 (20 Sep 2022 07:51) (62 - 94)  BP: 148/79 (20 Sep 2022 07:51) (110/60 - 148/79)  BP(mean): --  ABP: --  ABP(mean): --  RR: 18 (20 Sep 2022 07:51) (16 - 18)  SpO2: 95% (20 Sep 2022 07:51) (95% - 98%)    O2 Parameters below as of 20 Sep 2022 07:51  Patient On (Oxygen Delivery Method): room air Pt reports smoking 1 PPD, drinking 2-3 mixed drinks, on disability currently. Lives with 2 of her ex-boyfriends. high cholesterol and chronic back pain
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
## 2022-09-20 NOTE — ED CDU PROVIDER DISPOSITION NOTE - PATIENT PORTAL LINK FT
You can access the FollowMyHealth Patient Portal offered by [facility] by registering at the following website: [email or web address]. By joining [employer]’s FollowMyHealth portal, you will also be able to view your health information using other applications (apps) compatible with our system.
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
## 2022-09-20 NOTE — ED ADULT TRIAGE NOTE - CCCP TRG CHIEF CMPLNT
"Problem: Patient Care Overview  Goal: Plan of Care/Patient Progress Review  Outcome: Improving     /52 (BP Location: Right arm)  Pulse 81  Temp 97.1  F (36.2  C) (Oral)  Resp 16  Ht 1.676 m (5' 6\")  Wt 103.5 kg (228 lb 2.8 oz)  SpO2 96%  BMI 36.83 kg/m2     5722-1284: Pt A&Ox4, with forgetfulness and difficulty finding words, denies pain and/or nausea, VSS on RA ex hypertensive, bedrest. BP elevated, most recent 172/52, unable to give PRN Hydralazine as does not reach criteria, will continue to monitor. IV antibiotics switched to PO Cipro today. LR 500mL bolus complete. BG at 149, given SSI. Potassium replacement completed and recheck tonight at 1845 and tomorrow AM per protocol. Johnson intact and patent with good UO, no BM this shift. Continue to monitor and with POC.       " psychiatric evaluation
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
## 2022-09-20 NOTE — ED BEHAVIORAL HEALTH ASSESSMENT NOTE - DEPENDENTS
Left message for patient to return phone call. Patient needs to schedule initial kidney with Dr. Segal.   Dilution (U/0.1 Cc): 2 None known
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
## 2022-09-20 NOTE — ED BEHAVIORAL HEALTH ASSESSMENT NOTE - DETAILS
pt held for reassessment. Pt denies any suicidal ideation, intent, plan or previous attempt. see above. Came to [facility] a few weeks ago, then went voluntarily to [facility]. ED provider to be informed. Pt agrees with current plan.

## 2022-09-27 ENCOUNTER — APPOINTMENT (OUTPATIENT)
Dept: ORTHOPEDIC SURGERY | Facility: CLINIC | Age: 57
End: 2022-09-27

## 2022-10-27 ENCOUNTER — OFFICE (OUTPATIENT)
Dept: URBAN - METROPOLITAN AREA CLINIC 104 | Facility: CLINIC | Age: 57
Setting detail: OPHTHALMOLOGY
End: 2022-10-27
Payer: MEDICARE

## 2022-10-27 ENCOUNTER — RX ONLY (RX ONLY)
Age: 57
End: 2022-10-27

## 2022-10-27 DIAGNOSIS — H00.12: ICD-10-CM

## 2022-10-27 PROCEDURE — 92004 COMPRE OPH EXAM NEW PT 1/>: CPT | Performed by: SPECIALIST

## 2022-10-27 ASSESSMENT — SPHEQUIV_DERIVED
OD_SPHEQUIV: 0.5
OS_SPHEQUIV: 0.5
OS_SPHEQUIV: 0.5
OD_SPHEQUIV: 0.5

## 2022-10-27 ASSESSMENT — REFRACTION_CURRENTRX
OS_OVR_VA: 20/
OD_OVR_VA: 20/

## 2022-10-27 ASSESSMENT — REFRACTION_MANIFEST
OS_ADD: +2.25
OD_SPHERE: +1.00
OD_ADD: +2.25
OS_VA1: 20/20
OS_AXIS: 140
OS_SPHERE: +0.75
OS_CYLINDER: -0.50
OD_CYLINDER: -1.00
OD_AXIS: 10
OD_VA1: 20/20

## 2022-10-27 ASSESSMENT — REFRACTION_AUTOREFRACTION
OS_CYLINDER: -0.50
OD_AXIS: 13
OD_CYLINDER: -1.00
OS_SPHERE: +0.75
OS_AXIS: 141
OD_SPHERE: +1.00

## 2022-10-27 ASSESSMENT — CONFRONTATIONAL VISUAL FIELD TEST (CVF)
OS_FINDINGS: FULL
OD_FINDINGS: FULL

## 2022-10-27 ASSESSMENT — TONOMETRY
OS_IOP_MMHG: 13
OD_IOP_MMHG: 13

## 2022-10-27 ASSESSMENT — VISUAL ACUITY
OD_BCVA: 20/30
OS_BCVA: 20/30

## 2022-11-22 ENCOUNTER — EMERGENCY (EMERGENCY)
Facility: HOSPITAL | Age: 57
LOS: 1 days | Discharge: HOME | End: 2022-11-22
Attending: EMERGENCY MEDICINE
Payer: MEDICARE

## 2022-11-22 VITALS
DIASTOLIC BLOOD PRESSURE: 84 MMHG | HEIGHT: 67 IN | OXYGEN SATURATION: 97 % | SYSTOLIC BLOOD PRESSURE: 168 MMHG | TEMPERATURE: 98 F | RESPIRATION RATE: 18 BRPM | WEIGHT: 149.91 LBS | HEART RATE: 109 BPM

## 2022-11-22 LAB
RAPID RVP RESULT: SIGNIFICANT CHANGE UP
SARS-COV-2 RNA SPEC QL NAA+PROBE: SIGNIFICANT CHANGE UP

## 2022-11-22 PROCEDURE — 99283 EMERGENCY DEPT VISIT LOW MDM: CPT

## 2022-11-22 PROCEDURE — 99284 EMERGENCY DEPT VISIT MOD MDM: CPT

## 2022-11-22 PROCEDURE — 0225U NFCT DS DNA&RNA 21 SARSCOV2: CPT

## 2022-11-22 RX ORDER — KETOROLAC TROMETHAMINE 30 MG/ML
15 SYRINGE (ML) INJECTION ONCE
Refills: 0 | Status: COMPLETED | OUTPATIENT
Start: 2022-11-22 | End: 2022-11-22

## 2022-11-22 RX ORDER — FAMOTIDINE 10 MG/ML
20 INJECTION INTRAVENOUS ONCE
Refills: 0 | Status: DISCONTINUED | OUTPATIENT
Start: 2022-11-22 | End: 2022-11-30

## 2022-11-22 RX ORDER — ONDANSETRON 8 MG/1
4 TABLET, FILM COATED ORAL ONCE
Refills: 0 | Status: DISCONTINUED | OUTPATIENT
Start: 2022-11-22 | End: 2022-11-30

## 2022-11-22 RX ORDER — SODIUM CHLORIDE 9 MG/ML
1000 INJECTION INTRAMUSCULAR; INTRAVENOUS; SUBCUTANEOUS ONCE
Refills: 0 | Status: DISCONTINUED | OUTPATIENT
Start: 2022-11-22 | End: 2022-11-30

## 2022-11-22 NOTE — ED PROVIDER NOTE - NS ED ROS FT
Constitutional: (-) fever  (+)chills  (-)sweats (+) weakness  Eyes/ENT: (-) blurry vision, (-) epistaxis  (-)rhinorrhea   (-) sore throat (+) nasal congestion  Cardiovascular: (-) chest pain, (-) palpitations (-) edema   Respiratory: (-) cough, (-) shortness of breath   Gastrointestinal: (+)nausea  (-)vomiting, (-) diarrhea  (-) abdominal pain   :  (-)dysuria, (-)frequency, (-)urgency, (-)hematuria  Musculoskeletal: (-) neck pain, (-) back pain, (-) joint pain  Integumentary: (-) rash, (-) edema  Neurological: (-) headache, (-) altered mental status  (-)LOC Constitutional: (-) fever  (+)chills  (-)sweats (+) weakness  Eyes/ENT:  (-)rhinorrhea   (-) sore throat (+) nasal congestion  Cardiovascular: (-) chest pain, (-) palpitations (-) edema   Respiratory: (-) cough, (-) shortness of breath   Gastrointestinal: (+)nausea  (-)vomiting, (-) diarrhea  (-) abdominal pain   :  (-)dysuria, (-)frequency, (-)urgency, (-)hematuria  Musculoskeletal: (-) neck pain, (-) back pain, (-) joint pain  Integumentary: (-) rash, (-) edema  Neurological: (-) headache, (-) altered mental status  (-)LOC

## 2022-11-22 NOTE — ED PROVIDER NOTE - CLINICAL SUMMARY MEDICAL DECISION MAKING FREE TEXT BOX
flu like illness in setting of potential HIV, may be acute phase viral syndrome. Will do labs, IV fluids, and re-assess. flu like illness in setting of potential HIV, may be acute reactive phase of HIV. Will do labs, IV fluids, and re-assess.

## 2022-11-22 NOTE — ED PROVIDER NOTE - PHYSICAL EXAMINATION
Gen: Alert, NAD  Head: NC, AT, PERRL, EOMI, normal lids/conjunctiva  ENT: B TM WNL, normal hearing, patent oropharynx without erythema/exudate, uvula midline  Neck: +supple, no tenderness/meningismus/JVD, +Trachea midline  Pulm: Bilateral BS, normal resp effort, no wheeze/stridor/retractions  CV: RRR, no M/R/G, +dist pulses  Abd: soft, NT/ND, +BS, no hepatosplenomegaly  Mskel: no edema/erythema/cyanosis  Skin: no rash  Neuro: AAOx3, no sensory/motor deficits, CN 2-12 intact Gen: Alert, NAD  Head: NC, AT, PERRL, EOMI, normal lids/conjunctiva  ENT: B TM WNL, normal hearing, patent oropharynx without erythema/exudate, uvula midline  Neck: +supple, no tenderness/meningismus/JVD, +Trachea midline  Pulm: Bilateral BS, normal resp effort, no wheeze/stridor/retractions  CV: RRR, no M/R/G, +dist pulses  Abd: soft, NT/ND, +BS, no hepatosplenomegaly  Mskel: no edema/erythema/cyanosis  Skin: no rash  Neuro: grossly intact

## 2022-11-22 NOTE — ED PROVIDER NOTE - NSICDXPASTMEDICALHX_GEN_ALL_CORE_FT
PAST MEDICAL HISTORY:  Back problem     COPD, mild     High cholesterol     HTN (hypertension)

## 2022-11-22 NOTE — ED ADULT NURSE NOTE - HIV OFFER
[FreeTextEntry1] : 68-year-old female currently medically stable with history of hypothyroidism, hemochromatosis and hyperlipidemia. Patient also prediabetic and glucose as well as hemoglobin A1c levels will be checked.\par \par The patient is very noncompliant with followups and has not followed up with GYN, hematology, and still has not gotten a colonoscopy and is way overdue for mammography and bone density.\par \par Patient to continue diet and exercise\par \par Colonoscopy-patient has never had and is again referred\par Mammography October 2016. Again given referral\par Bone density has never had-patient given referral for this and mammography\par GYN overdue and will followup\par The patient was referred last year cardiology which she did not do it again is referred to\par \par High-dose influenza vaccine given left deltoid\par Pneumovax given right deltoid\par \par Venipuncture done at today's visit in the office\par \par Followup in 6 months
Previously Declined (within the last year)
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
## 2022-11-22 NOTE — ED PROVIDER NOTE - NS ED ATTENDING STATEMENT MOD
This was a shared visit with the YRN. I reviewed and verified the documentation and independently performed the documented:

## 2022-11-23 PROBLEM — E78.00 PURE HYPERCHOLESTEROLEMIA, UNSPECIFIED: Chronic | Status: ACTIVE | Noted: 2022-09-20

## 2022-11-23 PROBLEM — I10 ESSENTIAL (PRIMARY) HYPERTENSION: Chronic | Status: ACTIVE | Noted: 2022-09-20

## 2022-11-23 PROBLEM — J44.9 CHRONIC OBSTRUCTIVE PULMONARY DISEASE, UNSPECIFIED: Chronic | Status: ACTIVE | Noted: 2022-09-20

## 2022-11-23 PROBLEM — M53.9 DORSOPATHY, UNSPECIFIED: Chronic | Status: ACTIVE | Noted: 2022-09-20

## 2023-07-27 NOTE — ED BEHAVIORAL HEALTH ASSESSMENT NOTE - MEDICATIONS (PRESCRIPTIONS, DIRECTIONS)
You can access the FollowMyHealth Patient Portal offered by [facility] by registering at the following website: [email or web address]. By joining [employer]’s FollowMyHealth portal, you will also be able to view your health information using other applications (apps) compatible with our system. Continue medications.

## 2023-08-09 ENCOUNTER — OFFICE (OUTPATIENT)
Dept: URBAN - METROPOLITAN AREA CLINIC 104 | Facility: CLINIC | Age: 58
Setting detail: OPHTHALMOLOGY
End: 2023-08-09
Payer: MEDICARE

## 2023-08-09 DIAGNOSIS — H02.822: ICD-10-CM

## 2023-08-09 PROCEDURE — 92285 EXTERNAL OCULAR PHOTOGRAPHY: CPT | Performed by: OPHTHALMOLOGY

## 2023-08-09 PROCEDURE — 67840 REMOVE EYELID LESION: CPT | Performed by: OPHTHALMOLOGY

## 2023-08-09 ASSESSMENT — CONFRONTATIONAL VISUAL FIELD TEST (CVF)
OS_FINDINGS: FULL
OD_FINDINGS: FULL

## 2023-08-09 ASSESSMENT — SUPERFICIAL PUNCTATE KERATITIS (SPK)
OS_SPK: 3+
OD_SPK: 3+

## 2023-08-09 ASSESSMENT — LID EXAM ASSESSMENTS
OD_BLEPHARITIS: RLL RUL 2+
OS_BLEPHARITIS: LLL LUL 2+

## 2023-08-09 ASSESSMENT — TONOMETRY
OS_IOP_MMHG: 10
OD_IOP_MMHG: 13

## 2023-08-10 ENCOUNTER — RX ONLY (RX ONLY)
Age: 58
End: 2023-08-10

## 2023-08-10 ASSESSMENT — VISUAL ACUITY
OS_BCVA: 20/30
OD_BCVA: 20/30

## 2023-08-23 ENCOUNTER — OFFICE (OUTPATIENT)
Dept: URBAN - METROPOLITAN AREA CLINIC 104 | Facility: CLINIC | Age: 58
Setting detail: OPHTHALMOLOGY
End: 2023-08-23
Payer: MEDICARE

## 2023-08-23 DIAGNOSIS — H02.822: ICD-10-CM

## 2023-08-23 PROBLEM — H01.002 BLEPHARITIS; RIGHT UPPER LID, RIGHT LOWER LID, LEFT UPPER LID, LEFT LOWER LID: Status: ACTIVE | Noted: 2023-08-23

## 2023-08-23 PROBLEM — H16.223 DRY EYE SYNDROME K SICCA; BOTH EYES: Status: ACTIVE | Noted: 2023-08-09

## 2023-08-23 PROBLEM — H01.001 BLEPHARITIS; RIGHT UPPER LID, RIGHT LOWER LID, LEFT UPPER LID, LEFT LOWER LID: Status: ACTIVE | Noted: 2023-08-23

## 2023-08-23 PROBLEM — H01.005 BLEPHARITIS; RIGHT UPPER LID, RIGHT LOWER LID, LEFT UPPER LID, LEFT LOWER LID: Status: ACTIVE | Noted: 2023-08-23

## 2023-08-23 PROBLEM — H01.004 BLEPHARITIS; RIGHT UPPER LID, RIGHT LOWER LID, LEFT UPPER LID, LEFT LOWER LID: Status: ACTIVE | Noted: 2023-08-23

## 2023-08-23 PROCEDURE — 99213 OFFICE O/P EST LOW 20 MIN: CPT | Performed by: OPHTHALMOLOGY

## 2023-08-23 ASSESSMENT — VISUAL ACUITY
OS_BCVA: 20/25
OD_BCVA: 20/30

## 2023-08-23 ASSESSMENT — TONOMETRY: OD_IOP_MMHG: 15

## 2023-08-23 ASSESSMENT — SUPERFICIAL PUNCTATE KERATITIS (SPK)
OD_SPK: 3+
OS_SPK: 3+

## 2023-08-23 ASSESSMENT — LID EXAM ASSESSMENTS
OD_BLEPHARITIS: RLL RUL 2+
OS_BLEPHARITIS: LLL LUL 2+

## 2023-08-23 ASSESSMENT — CONFRONTATIONAL VISUAL FIELD TEST (CVF)
OD_FINDINGS: FULL
OS_FINDINGS: FULL

## 2023-12-27 DIAGNOSIS — Z01.419 ENCOUNTER FOR GYNECOLOGICAL EXAMINATION (GENERAL) (ROUTINE) W/OUT ABNORMAL FINDINGS: ICD-10-CM

## 2023-12-27 DIAGNOSIS — Z12.39 ENCOUNTER FOR OTHER SCREENING FOR MALIGNANT NEOPLASM OF BREAST: ICD-10-CM

## 2023-12-27 DIAGNOSIS — M25.531 PAIN IN RIGHT WRIST: ICD-10-CM

## 2023-12-27 DIAGNOSIS — M25.559 PAIN IN UNSPECIFIED HIP: ICD-10-CM

## 2023-12-27 DIAGNOSIS — J31.0 CHRONIC RHINITIS: ICD-10-CM

## 2023-12-27 DIAGNOSIS — R92.2 INCONCLUSIVE MAMMOGRAM: ICD-10-CM

## 2023-12-27 DIAGNOSIS — Z12.4 ENCOUNTER FOR SCREENING FOR MALIGNANT NEOPLASM OF CERVIX: ICD-10-CM

## 2023-12-27 DIAGNOSIS — Z12.31 ENCOUNTER FOR SCREENING MAMMOGRAM FOR MALIGNANT NEOPLASM OF BREAST: ICD-10-CM

## 2023-12-27 DIAGNOSIS — Z11.51 ENCOUNTER FOR SCREENING FOR HUMAN PAPILLOMAVIRUS (HPV): ICD-10-CM
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
## 2023-12-27 DIAGNOSIS — M25.552 PAIN IN LEFT HIP: ICD-10-CM

## 2023-12-27 DIAGNOSIS — R92.30 INCONCLUSIVE MAMMOGRAM: ICD-10-CM

## 2024-01-01 NOTE — ED BEHAVIORAL HEALTH ASSESSMENT NOTE - RISK ASSESSMENT
If your baby has any of the following, CALL YOUR PEDIATRICIAN OR RETURN TO THE HOSPITAL Moderate Acute Suicide Risk Risk factors: previous substance abuse, lack of insight  Protective factors: residential stability, social support, no previous SA  Moderate risk for dangerous behavior

## 2024-01-04 ENCOUNTER — APPOINTMENT (OUTPATIENT)
Dept: OBGYN | Facility: CLINIC | Age: 59
End: 2024-01-04
Payer: MEDICARE

## 2024-01-04 DIAGNOSIS — Z86.59 PERSONAL HISTORY OF OTHER MENTAL AND BEHAVIORAL DISORDERS: ICD-10-CM

## 2024-01-04 DIAGNOSIS — I10 ESSENTIAL (PRIMARY) HYPERTENSION: ICD-10-CM

## 2024-01-04 DIAGNOSIS — R10.2 PELVIC AND PERINEAL PAIN: ICD-10-CM

## 2024-01-04 PROCEDURE — 99386 PREV VISIT NEW AGE 40-64: CPT | Mod: GY

## 2024-01-11 ENCOUNTER — NON-APPOINTMENT (OUTPATIENT)
Age: 59
End: 2024-01-11
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
## 2024-01-11 NOTE — HISTORY OF PRESENT ILLNESS
[FreeTextEntry1] : Patient is a 58-year-old  3 para 0-0-3-0 last menstrual period  Patient presents for initial GYN visit complaining of left pelvic pain and also radiating up to her left mid abdominal region

## 2024-01-23 LAB
A VAGINAE DNA VAG QL NAA+PROBE: NORMAL
BVAB2 DNA VAG QL NAA+PROBE: NORMAL
C KRUSEI DNA VAG QL NAA+PROBE: NEGATIVE
C TRACH RRNA SPEC QL NAA+PROBE: NEGATIVE
CANDIDA DNA VAG QL NAA+PROBE: NEGATIVE
CYTOLOGY CVX/VAG DOC THIN PREP: NORMAL
MEGA1 DNA VAG QL NAA+PROBE: NORMAL
N GONORRHOEA RRNA SPEC QL NAA+PROBE: NEGATIVE
T VAGINALIS RRNA SPEC QL NAA+PROBE: NEGATIVE

## 2024-09-29 NOTE — ASSESSMENT
[FreeTextEntry1] : Unclear if this is anxiety or other psych disorder. \par Will do complete labs, including O&P. \par She was reassured, no treatment needed at this time. \par Follow up with psychiatry and her psychotherapist. 
339.9

## 2025-04-29 NOTE — ED PROVIDER NOTE - CROS ED CARDIOVAS ALL NEG
Writer contacted pt's mom in regard to negative urine bacterial culture.  According to Dr. Espinoza's note, pt to stop taking antibiotic due to no growth of bacteria in culture. Mom understood, stated son is acting more lethargic maybe due to some kind of virus. Mom educated on s/s of when to take pt to be evaluated in UC or ED. All questions answered, mom verbalized understanding.     negative...